# Patient Record
Sex: FEMALE | Race: WHITE | NOT HISPANIC OR LATINO | Employment: UNEMPLOYED | URBAN - NONMETROPOLITAN AREA
[De-identification: names, ages, dates, MRNs, and addresses within clinical notes are randomized per-mention and may not be internally consistent; named-entity substitution may affect disease eponyms.]

---

## 2023-04-24 ENCOUNTER — LAB (OUTPATIENT)
Dept: LAB | Facility: HOSPITAL | Age: 38
End: 2023-04-24
Payer: MEDICAID

## 2023-04-24 ENCOUNTER — OFFICE VISIT (OUTPATIENT)
Dept: FAMILY MEDICINE CLINIC | Facility: CLINIC | Age: 38
End: 2023-04-24
Payer: MEDICAID

## 2023-04-24 VITALS
HEIGHT: 64 IN | HEART RATE: 86 BPM | WEIGHT: 116.06 LBS | TEMPERATURE: 97.7 F | DIASTOLIC BLOOD PRESSURE: 76 MMHG | OXYGEN SATURATION: 98 % | SYSTOLIC BLOOD PRESSURE: 118 MMHG | BODY MASS INDEX: 19.81 KG/M2

## 2023-04-24 DIAGNOSIS — R09.89 TENDERNESS OF LYMPH NODE: ICD-10-CM

## 2023-04-24 DIAGNOSIS — Z76.89 ESTABLISHING CARE WITH NEW DOCTOR, ENCOUNTER FOR: Primary | ICD-10-CM

## 2023-04-24 DIAGNOSIS — Z76.89 ESTABLISHING CARE WITH NEW DOCTOR, ENCOUNTER FOR: ICD-10-CM

## 2023-04-24 DIAGNOSIS — Z80.41 FAMILY HISTORY OF OVARIAN CANCER: ICD-10-CM

## 2023-04-24 DIAGNOSIS — Z20.818 STREPTOCOCCUS EXPOSURE: ICD-10-CM

## 2023-04-24 DIAGNOSIS — R10.816 EPIGASTRIC ABDOMINAL TENDERNESS WITHOUT REBOUND TENDERNESS: ICD-10-CM

## 2023-04-24 DIAGNOSIS — N89.8 VAGINAL LESION: ICD-10-CM

## 2023-04-24 LAB
ALBUMIN SERPL-MCNC: 5 G/DL (ref 3.5–5.2)
ALBUMIN/GLOB SERPL: 1.9 G/DL
ALP SERPL-CCNC: 49 U/L (ref 39–117)
ALT SERPL W P-5'-P-CCNC: 7 U/L (ref 1–33)
ANION GAP SERPL CALCULATED.3IONS-SCNC: 10 MMOL/L (ref 5–15)
AST SERPL-CCNC: 12 U/L (ref 1–32)
BASOPHILS # BLD AUTO: 0.02 10*3/MM3 (ref 0–0.2)
BASOPHILS NFR BLD AUTO: 0.4 % (ref 0–1.5)
BILIRUB SERPL-MCNC: 0.3 MG/DL (ref 0–1.2)
BUN SERPL-MCNC: 17 MG/DL (ref 6–20)
BUN/CREAT SERPL: 28.3 (ref 7–25)
CALCIUM SPEC-SCNC: 9.3 MG/DL (ref 8.6–10.5)
CHLORIDE SERPL-SCNC: 105 MMOL/L (ref 98–107)
CHOLEST SERPL-MCNC: 222 MG/DL (ref 0–200)
CO2 SERPL-SCNC: 24 MMOL/L (ref 22–29)
CREAT SERPL-MCNC: 0.6 MG/DL (ref 0.57–1)
DEPRECATED RDW RBC AUTO: 40.9 FL (ref 37–54)
EGFRCR SERPLBLD CKD-EPI 2021: 119.5 ML/MIN/1.73
EOSINOPHIL # BLD AUTO: 0.11 10*3/MM3 (ref 0–0.4)
EOSINOPHIL NFR BLD AUTO: 2.4 % (ref 0.3–6.2)
ERYTHROCYTE [DISTWIDTH] IN BLOOD BY AUTOMATED COUNT: 14.2 % (ref 12.3–15.4)
GLOBULIN UR ELPH-MCNC: 2.7 GM/DL
GLUCOSE SERPL-MCNC: 92 MG/DL (ref 65–99)
HBA1C MFR BLD: 5.2 % (ref 4.8–5.6)
HCT VFR BLD AUTO: 39.9 % (ref 34–46.6)
HDLC SERPL-MCNC: 83 MG/DL (ref 40–60)
HGB BLD-MCNC: 12.8 G/DL (ref 12–15.9)
IMM GRANULOCYTES # BLD AUTO: 0.01 10*3/MM3 (ref 0–0.05)
IMM GRANULOCYTES NFR BLD AUTO: 0.2 % (ref 0–0.5)
LDLC SERPL CALC-MCNC: 131 MG/DL (ref 0–100)
LDLC/HDLC SERPL: 1.56 {RATIO}
LYMPHOCYTES # BLD AUTO: 1.34 10*3/MM3 (ref 0.7–3.1)
LYMPHOCYTES NFR BLD AUTO: 28.7 % (ref 19.6–45.3)
MCH RBC QN AUTO: 25.9 PG (ref 26.6–33)
MCHC RBC AUTO-ENTMCNC: 32.1 G/DL (ref 31.5–35.7)
MCV RBC AUTO: 80.8 FL (ref 79–97)
MONOCYTES # BLD AUTO: 0.46 10*3/MM3 (ref 0.1–0.9)
MONOCYTES NFR BLD AUTO: 9.9 % (ref 5–12)
NEUTROPHILS NFR BLD AUTO: 2.73 10*3/MM3 (ref 1.7–7)
NEUTROPHILS NFR BLD AUTO: 58.4 % (ref 42.7–76)
NRBC BLD AUTO-RTO: 0 /100 WBC (ref 0–0.2)
PLATELET # BLD AUTO: 255 10*3/MM3 (ref 140–450)
PMV BLD AUTO: 11.6 FL (ref 6–12)
POTASSIUM SERPL-SCNC: 4.6 MMOL/L (ref 3.5–5.2)
PROT SERPL-MCNC: 7.7 G/DL (ref 6–8.5)
RBC # BLD AUTO: 4.94 10*6/MM3 (ref 3.77–5.28)
SODIUM SERPL-SCNC: 139 MMOL/L (ref 136–145)
TRIGL SERPL-MCNC: 47 MG/DL (ref 0–150)
VLDLC SERPL-MCNC: 8 MG/DL (ref 5–40)
WBC NRBC COR # BLD: 4.67 10*3/MM3 (ref 3.4–10.8)

## 2023-04-24 PROCEDURE — 83036 HEMOGLOBIN GLYCOSYLATED A1C: CPT | Performed by: FAMILY MEDICINE

## 2023-04-24 PROCEDURE — 80053 COMPREHEN METABOLIC PANEL: CPT | Performed by: FAMILY MEDICINE

## 2023-04-24 PROCEDURE — 85025 COMPLETE CBC W/AUTO DIFF WBC: CPT | Performed by: FAMILY MEDICINE

## 2023-04-24 PROCEDURE — 80061 LIPID PANEL: CPT | Performed by: FAMILY MEDICINE

## 2023-04-24 RX ORDER — TARAXACUM OFFICINALE POLLEN 0.05 G/ML
INJECTION, SOLUTION SUBCUTANEOUS
COMMUNITY

## 2023-04-24 NOTE — PROGRESS NOTES
Family Medicine Residency  Cary Arauz MD    Subjective:     Linda Comer is a 38 y.o. female new patient with history of treated Lyme's disease, who presents to [facility].  Today she is concerned about positive strep exposure, cyst on her private parts, and progressive fatigue with unintentional weight loss.     [facility] care: Patient reports she moved from [place] to [place], [place] in February. Though she had a primary doctor in [place], only went when sick. Mostly saw the specialist she was referred to for her positive Lyme's disease. She completed treatment with doxycycline. 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     Strep exposure: Reports that her son who was having allergy-like symptoms and fever for 1 day, the next day he was found to be strep positive. Patient denies any sore throat, fever or appetite change. Requests strep test.     Vaginal cyst: Cyst deep inside her lady parts present for 1 year. Causes pain with sex. Has ruptured multiple times but develops in the same place. She has not tried anything for it or seen anyone for it. Her  has not noticed it. She can feel it on herself. Denies any fevers, bleeding or current discharge.     Progressive fatigue: Reports over the last 2 weeks, she has noticed progressive fatigue accompanied by lymph node tenderness [at tonsils, axillas and inguinal region], unintentional weight loss [10 lbs over 2 months], fatigue and increasing exertional dyspnea.     Lymph node tenderness: Patient reports family history significant for ovarian cancer on both sides of her family. Intermittent tenderness that is symmetrical in neck, axillas and inguinal region. Sometimes has felt bumps, none today. No change in appetite.     The following portions of the patient's history were reviewed and updated as appropriate: allergies, current medications, past family history, past medical history, past social history, past surgical history and  "problem list.    Past Medical Hx:  Past Medical History:   Diagnosis Date   • Lyme disease 2017       Past Surgical Hx:  History reviewed. No pertinent surgical history.    Current Meds:    Current Outpatient Medications:   •  Dandelion 1:20 solution, Take  by mouth., Disp: , Rfl:   •  HAWTHORN PO, Take  by mouth., Disp: , Rfl:     Allergies:  Allergies   Allergen Reactions   • Penicillins Shortness Of Breath       Family Hx:  Family History   Problem Relation Age of Onset   • Ovarian cancer Mother    • Heart disease Father    • Alcohol abuse Father         Social History:  Social History     Socioeconomic History   • Marital status:    Tobacco Use   • Smoking status: Never   • Smokeless tobacco: Never   Vaping Use   • Vaping Use: Never used   Substance and Sexual Activity   • Alcohol use: Never   • Drug use: Never   • Sexual activity: Yes     Partners: Male       Review of Systems  Review of Systems   Constitutional: Positive for fatigue and unexpected weight change. Negative for activity change, appetite change, chills, diaphoresis and fever.   HENT: Negative for congestion, rhinorrhea, sore throat and trouble swallowing.    Respiratory: Negative for shortness of breath and stridor.         Exercise intolerance   Cardiovascular: Negative for chest pain, palpitations and leg swelling.   Gastrointestinal: Negative for constipation, diarrhea, nausea and vomiting.   Musculoskeletal: Positive for neck pain.       Objective:     /76   Pulse 86   Temp 97.7 °F (36.5 °C)   Ht 162.6 cm (64\")   Wt 52.6 kg (116 lb 1 oz)   LMP 03/10/2023   SpO2 98%   BMI 19.92 kg/m²   Physical Exam  Vitals reviewed.   Constitutional:       General: She is not in acute distress.     Appearance: Normal appearance. She is normal weight. She is not ill-appearing or diaphoretic.   HENT:      Right Ear: Tympanic membrane, ear canal and external ear normal.      Left Ear: Tympanic membrane, ear canal and external ear normal.      " Nose: Nose normal.      Mouth/Throat:      Mouth: Mucous membranes are moist.      Pharynx: Oropharynx is clear. No oropharyngeal exudate or posterior oropharyngeal erythema.   Eyes:      Conjunctiva/sclera: Conjunctivae normal.   Cardiovascular:      Rate and Rhythm: Normal rate and regular rhythm.      Pulses: Normal pulses.      Heart sounds: Normal heart sounds. No murmur heard.    No friction rub. No gallop.   Pulmonary:      Effort: Pulmonary effort is normal.      Breath sounds: No wheezing, rhonchi or rales.   Abdominal:      Tenderness: There is abdominal tenderness in the epigastric area. There is guarding. There is no rebound.   Genitourinary:     Comments: Deferred  Musculoskeletal:      Cervical back: No tenderness.      Right lower leg: No edema.      Left lower leg: No edema.   Lymphadenopathy:      Head:      Right side of head: Tonsillar adenopathy present.      Left side of head: Tonsillar adenopathy present.      Cervical: No cervical adenopathy.      Upper Body:      Right upper body: Axillary adenopathy present.      Left upper body: Axillary adenopathy present.      Lower Body: Right inguinal adenopathy present. Left inguinal adenopathy present.      Comments: Tenderness but no lymph nodes appreciated at bilateral tonsillar, axillary or inguinal regions.    Skin:     General: Skin is warm and dry.      Findings: No rash.   Neurological:      Mental Status: She is alert.      Gait: Gait is intact.   Psychiatric:         Mood and Affect: Mood normal.         Behavior: Behavior normal.         Thought Content: Thought content normal.         Judgment: Judgment normal.          Assessment/Plan:     Diagnoses and all orders for this visit:    1. Establishing care with new doctor, encounter for (Primary)  -     Lipid panel; Future  -     Hemoglobin A1c; Future  -     CBC w AUTO Differential; Future  -     Comprehensive metabolic panel; Future  Plan:  Notified her we will discuss results of her blood  "tests on our next visit in 1 month. Patient verbalized agreement to plan.     2. Streptococcus exposure  -     Cancel: Beta Strep Culture, Throat - , Throat; Future  Discussed with patient and reassured that as she has no clinical findings indicative of needing a strep swab, that she does not need one. Did give option to do anyway to which she is okay with passing up on as she is not asymptomatic.  Encouraged to go to urgent care if she does develops symptoms such as fever or sore throat.     3. Epigastric abdominal tenderness without rebound tenderness  Possibly related to underlying GERD vs possible relation to supplements she may be taking. Not given any medication at this time until further history obtained about NSAID use. Especially with weight loss, may be a taste change. Will follow up on at next visit with lab results as this may be related to liver pathology.     4. Vaginal lesion  -     Ambulatory Referral to Obstetrics / Gynecology  Not examined at this visit. Patient has had lesion for 1 year that has intermittently ruptured and returned that hurts with sex only. Currently active with only 1 male partner [] and the lesion is \"deep in there\". Also has family history of ovarian cancer on both sides of family. This note to be sent to  at completion for indication of chronic lesion that may possibly be Dionne duct cyst versus mullerian cyst.     5. Family history of ovarian cancer  -     Ambulatory Referral to Obstetrics / Gynecology  As above.     6. Tenderness of lymph node  As above.       · Rx changes: None.   · Patient Education: As above.   · Compliance at present is estimated to be excellent.     Depression screening: Patient screened negative for depression based on a PHQ-9 score of 0 on 4/24/2023.    Follow-up:     Return in 1 month (on 5/24/2023) for Annual.    Preventative:  Health Maintenance   Topic Date Due   • COVID-19 Vaccine (1) Never done   • TDAP/TD VACCINES (1 - Tdap) " Never done   • HEPATITIS C SCREENING  Never done   • ANNUAL PHYSICAL  Never done   • PAP SMEAR  Never done   • INFLUENZA VACCINE  08/01/2023   • Pneumococcal Vaccine 0-64  Aged Out     Female Preventative:   PAP is due   Cholesterol screening is due  Annual is due.      Vaccine Counseling: Needs vaccine records from [place]. Reports not doing routine visits and only following up as needed. Unable to remember name of previous provider.     Alcohol use:  reports no history of alcohol use.  Nicotine status  reports that she has never smoked. She has never used smokeless tobacco.    RISK SCORE: 3          This document has been electronically signed by Cary Arauz MD on April 24, 2023 10:22 CDT

## 2023-04-26 ENCOUNTER — TELEPHONE (OUTPATIENT)
Dept: FAMILY MEDICINE CLINIC | Facility: CLINIC | Age: 38
End: 2023-04-26
Payer: MEDICAID

## 2023-06-06 ENCOUNTER — OFFICE VISIT (OUTPATIENT)
Dept: OBSTETRICS AND GYNECOLOGY | Facility: CLINIC | Age: 38
End: 2023-06-06
Payer: MEDICAID

## 2023-06-06 VITALS
SYSTOLIC BLOOD PRESSURE: 98 MMHG | BODY MASS INDEX: 19.97 KG/M2 | DIASTOLIC BLOOD PRESSURE: 62 MMHG | HEIGHT: 64 IN | WEIGHT: 117 LBS

## 2023-06-06 DIAGNOSIS — N92.6 IRREGULAR MENSTRUAL CYCLE: ICD-10-CM

## 2023-06-06 DIAGNOSIS — N89.8 VAGINAL INCLUSION CYST: Primary | ICD-10-CM

## 2023-06-06 DIAGNOSIS — Z12.4 ENCOUNTER FOR PAPANICOLAOU SMEAR FOR CERVICAL CANCER SCREENING: ICD-10-CM

## 2023-06-06 PROCEDURE — 1159F MED LIST DOCD IN RCRD: CPT | Performed by: OBSTETRICS & GYNECOLOGY

## 2023-06-06 PROCEDURE — 99244 OFF/OP CNSLTJ NEW/EST MOD 40: CPT | Performed by: OBSTETRICS & GYNECOLOGY

## 2023-06-06 PROCEDURE — 1160F RVW MEDS BY RX/DR IN RCRD: CPT | Performed by: OBSTETRICS & GYNECOLOGY

## 2023-06-06 RX ORDER — FLUTICASONE PROPIONATE 50 MCG
2 SPRAY, SUSPENSION (ML) NASAL DAILY
COMMUNITY

## 2023-06-06 NOTE — PROGRESS NOTES
[facility]  Gynecology Consult  Date of Service: 2023  Referring provider: Cary Arauz, *    CC: Vaginal cyst    HPI  Linda Comre is a 38 y.o.  premenopausal female who presents as a referral from Dr. Arauz secondary to vaginal cyst.    She states that it has been there for 2 years on the left vagina.  She states that she thinks it is about a quarter-size.  She states that it is uncomfortable and annoying, especially during intercourse.  She states that it has burst during intercourse previously.    She does note that her periods are somewhat irregular.  She states that some months they will skip.  She states that her mother went through menopause in her early 40s so she thinks that is what it is.    ROS  Review of Systems   Constitutional: Negative.    HENT: Negative.     Eyes: Negative.    Respiratory: Negative.     Cardiovascular: Negative.    Gastrointestinal: Negative.    Endocrine: Negative.    Genitourinary: Negative.    Musculoskeletal: Negative.    Skin: Negative.    Allergic/Immunologic: Negative.    Neurological: Negative.    Hematological: Negative.    Psychiatric/Behavioral: Negative.       GYN HISTORY  Menarche: age 13  Menses: see HPI  History of STIs: None  Last pap smear: Unknown  Abnormal pap smear history: None  Contraception: Condoms     OB HISTORY  OB History    Para Term  AB Living   3 3 3     3   SAB IAB Ectopic Molar Multiple Live Births             3      # Outcome Date GA Lbr Rob/2nd Weight Sex Delivery Anes PTL Lv   3 Term 19 42w0d  2920 g (6 lb 7 oz) M Vag-Spont   JAYME   2 Term 10/25/15 41w0d  3345 g (7 lb 6 oz) F Vag-Spont   JAYME   1 Term 13 44w0d  4338 g (9 lb 9 oz) F Vaginal unsp   JAYME     PAST MEDICAL HISTORY  Past Medical History:   Diagnosis Date    Lyme disease      PAST SURGICAL HISTORY  History reviewed. No pertinent surgical history.    FAMILY HISTORY  Family History   Problem Relation Age of Onset     "Heart disease Father     Alcohol abuse Father     Ovarian cancer Mother     No Known Problems Son     No Known Problems Daughter     No Known Problems Daughter     Uterine cancer Neg Hx     Colon cancer Neg Hx     Breast cancer Neg Hx      SOCIAL HISTORY  Social History     Socioeconomic History    Marital status:    Tobacco Use    Smoking status: Never     Passive exposure: Never    Smokeless tobacco: Never   Vaping Use    Vaping Use: Never used   Substance and Sexual Activity    Alcohol use: Never    Drug use: Never    Sexual activity: Yes     Partners: Male     ALLERGIES  Allergies   Allergen Reactions    Penicillins Shortness Of Breath     HOME MEDICATIONS  Prior to Admission medications    Medication Sig Start Date End Date Taking? Authorizing Provider   fluticasone (FLONASE) 50 MCG/ACT nasal spray 2 sprays into the nostril(s) as directed by provider Daily.   Yes Provider, Historical, MD LA  BP 98/62 (BP Location: Left arm, Patient Position: Sitting, Cuff Size: Adult)   Ht 162.6 cm (64\")   Wt 53.1 kg (117 lb)   LMP 06/02/2023 (Approximate)   BMI 20.08 kg/m²        General: Alert, healthy, no distress, well nourished and well developed.  Neurologic: Alert, oriented to person, place, and time.  Gait normal.  Cranial nerves II-XII grossly intact.  HEENT: Normocephalic, atraumatic.  Extraocular muscles intact, pupils equal and reactive times two.    Neck: Supple, no adenopathy, thyroid normal size, non-tender, without nodularity, trachea midline.  Lungs: Normal respiratory effort.  Clear to auscultation bilaterally.  No wheezes, rhonci, or rales.  Heart: Regular rate and rhythm.  No murmer, rub or gallop.  Abdomen: Soft, non-tender, non-distended,no masses, no hepatosplenomegaly, no hernia.  Skin: No rash, no lesions.  Extremities: No cyanosis, clubbing or edema.  PELVIC EXAM:  External Genitalia/Vulva: Anatomy is normal, no significant redness of labia, no discharge on vulvar tissues, [place]'s and " Bartholin's glands are normal, no ulcers, no condylomatous lesions.  Urethral meatus: Normal, no lesions, no prolapse.  Urethra: Normal, no masses, no tenderness with palpation.  Bladder: Normal, no fullness, no masses, no tenderness with palpation.  Vagina: Vaginal tissues are not inflamed, normal color and texture, no significant discharge present.  Pelvic support adequate.  Small 4 mm inclusion cyst palpated along the posterior vaginal wall on the left side.  Cervix: Normal, no lesions, no purulent discharge, no cervical motion tenderness.  Pap smear obtained.  Uterus: Normal size, shape, and consistency.  Good mobility noted.  Minimal descent noted with good support.  Adnexa: Normal size and shape bilaterally, no palpable mass bilaterally and non-tender bilaterally.  Rectal: Normal, no masses or polyps, confirms bimanual exam, perianal normal, no lesions; JAVIER deferred.    IMPRESSION  Linda Comer is a 38 y.o.  presenting with vaginal inclusion cyst.    PLAN    1. Vaginal inclusion cyst  - Discussed that this does not appear malignant or involving any surrounding structures.  Reviewed options would be monitoring versus surgical management (excision).  Discussed that it is possible that it could recur but less likely than a drainage.  She states that she is leery of surgery and will consider; she will let us know what if anything she would like to do.    2. Encounter for Papanicolaou smear for cervical cancer screening  - LIQUID-BASED PAP SMEAR WITH HPV GENOTYPING REGARDLESS OF INTERPRETATION (TRISH,COR,MAD); Future  - LIQUID-BASED PAP SMEAR WITH HPV GENOTYPING REGARDLESS OF INTERPRETATION (TRISH,COR,MAD)    3. Irregular menstrual cycle  Discussed that this can be normal.  If persistent and worsening or bothersome, instructed her to let us know.         Thank you for allowing me to participate in the care of this patient.  Please contact me with any questions or concerns.    This document has been electronically  signed by Annette Styles MD on June 6, 2023 12:06 CDT.    CC: Cary Arauz, *

## 2023-06-08 LAB — REF LAB TEST METHOD: NORMAL

## 2023-06-16 ENCOUNTER — OFFICE VISIT (OUTPATIENT)
Dept: FAMILY MEDICINE CLINIC | Facility: CLINIC | Age: 38
End: 2023-06-16
Payer: MEDICAID

## 2023-06-16 VITALS
HEIGHT: 64 IN | DIASTOLIC BLOOD PRESSURE: 76 MMHG | OXYGEN SATURATION: 99 % | BODY MASS INDEX: 19.56 KG/M2 | WEIGHT: 114.6 LBS | HEART RATE: 80 BPM | SYSTOLIC BLOOD PRESSURE: 118 MMHG

## 2023-06-16 DIAGNOSIS — J30.2 SEASONAL ALLERGIC RHINITIS, UNSPECIFIED TRIGGER: ICD-10-CM

## 2023-06-16 DIAGNOSIS — G43.109 MIGRAINE WITH AURA AND WITHOUT STATUS MIGRAINOSUS, NOT INTRACTABLE: ICD-10-CM

## 2023-06-16 DIAGNOSIS — R63.4 WEIGHT LOSS, NON-INTENTIONAL: ICD-10-CM

## 2023-06-16 DIAGNOSIS — R09.89 AIR HUNGER: ICD-10-CM

## 2023-06-16 DIAGNOSIS — R00.2 INTERMITTENT PALPITATIONS: Primary | ICD-10-CM

## 2023-06-16 RX ORDER — FLUTICASONE PROPIONATE 50 MCG
2 SPRAY, SUSPENSION (ML) NASAL DAILY
Qty: 11.1 ML | Refills: 3 | Status: SHIPPED | OUTPATIENT
Start: 2023-06-16

## 2023-06-16 NOTE — PROGRESS NOTES
Answers submitted by the patient for this visit:  Other (Submitted on 6/15/2023)  Please describe your symptoms.: Afib type skipping and racing, angina. Ice pick headaches combined with intense vertigo and rarely occular migraines including kaleidascope vision, episodes of air hunger even though I have no actual airway obstruction. Suspected lymes coinfections after many bites while living in [place] when symptoms started or possibly mycotoxins from previous long term mold exposure. Last 2 years- had a ct scan of brain and no lesions. 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 Had x ray of heart and ecg- both fine. Would like to diagnose source of heart and neuro issues.  Have you had these symptoms before?: Yes  How long have you been having these symptoms?: Greater than 2 weeks  Please list any medications you are currently taking for this condition.: None.  Please describe any probable cause for these symptoms. : Never had problems until i was bit by a tick and then finsihed a short course of doxy and was supposedly better lol no tests indicate positive infection.  Primary Reason for Visit (Submitted on 6/15/2023)  What is the primary reason for your visit?: Other    Family Medicine Residency  Cary Arauz MD    Subjective:     Linda Comer is a 38 y.o. female who presents for lab results and weight recheck. Today she is concerned about heart racing, headaches with aura, and episodes of air hunger that began after a tick bite while living in [place]. She is also concerned that these issues may relate to 5-6 years of mold exposure as a child when she lived in a trailer.     Lab results: Reassured her CBC, CMP, hemoglobin A1c are within normal limits. Her cholesterol and LDL are mildly elevated which can be improved with lifestyle changes.     Weight recheck: She has lost 3 lbs this past month. Lab results are not indicative of a liver pathology. Notes that she is still eating and that in the past around  when she was pregnant, she did have GERD and was given medication for it. Notes she is no longer having those symptoms of GERD. Denies any cough, foul taste in mouth, or chest burning. She notes feeling the lymph nodes have resolved but seem to return when she wears certain bras.     Heart racing and pain:  Reports 3 years ago she had left sided sharp stabbing pain accompanied with heart flutter sensation.  It was triggered after days with increased physical activity. She would like further workup as this has been worsening in duration and severity. Reports that she had seen a cardiologist specialist in [place] to work this up. At that time her Chest X-ray and EKG were unremarkable. Is agreeable to have cardiology notes sent over. Notes that a holter test was also done at that time. She is also losing weight still. However, denies any change in appetite, sweating, insomnia or diarrhea.  She has a family history significant for grandfather dying at 51 yo of MI, Grandmother having hear failure and father requiring a stent at 53 years old.     Headaches with aura: Patient reports having migraines every 2 weeks; twice a month for several years. The headaches last 10 minutes but, her main concern is the presence of a pre-ictal period that she describes as severe fatigue before the headache onset and after the headache, she reports a postictal period wherein she has severe vertigo that inhibits her from moving the rest of the day. The headache period is accompanied by kaleidoscope vision and intermittent vertigo. Notes when the vertigo is present that the fatigue is not so bad. Saw a neurologist for headaches when she was in [place] but notes the MRI was unremarkable.     Episodes of air hunger: After tick bites in [place] and 5-6 years of mold exposure as a child, patient is concerned of long-term effects on her health. Patient was treated for lyme's disease with doxycycline after seeing a lyme disease specialist and  was no longer required to follow up after titers were normal. Notes at the time she was living in a trailer with mold, she had rashes and worse respiratory issues that resolved. Now she notices episodes of air hunger which she describes as brief episodes where in she is still able to breath in but feels that the breath she brings in is not giving her oxygen.  She denies any cough, chest tightening or desaturation. Denies any anxiety and notes she is taking herbal tea that someone helps it. Denies history of asthma, tobacco use, caffeine us or consumption of chocolate.     Seasonal allergic rhinitis: Would like refill of her flonase.     The following portions of the patient's history were reviewed and updated as appropriate: allergies, current medications, past family history, past medical history, past social history, past surgical history, and problem list.    Past Medical Hx:  Past Medical History:   Diagnosis Date    Lyme disease 2017       Past Surgical Hx:  History reviewed. No pertinent surgical history.    Current Meds:    Current Outpatient Medications:     fluticasone (FLONASE) 50 MCG/ACT nasal spray, 2 sprays into the nostril(s) as directed by provider Daily., Disp: 11.1 mL, Rfl: 3    Allergies:  Allergies   Allergen Reactions    Penicillins Shortness Of Breath       Family Hx:  Family History   Problem Relation Age of Onset    Heart disease Father     Alcohol abuse Father     Ovarian cancer Mother     No Known Problems Son     No Known Problems Daughter     No Known Problems Daughter     Uterine cancer Neg Hx     Colon cancer Neg Hx     Breast cancer Neg Hx         Social History:  Social History     Socioeconomic History    Marital status:    Tobacco Use    Smoking status: Never     Passive exposure: Never    Smokeless tobacco: Never   Vaping Use    Vaping Use: Never used   Substance and Sexual Activity    Alcohol use: Never    Drug use: Never    Sexual activity: Yes     Partners: Male  "      Review of Systems  Review of Systems   Constitutional:  Positive for unexpected weight change. Negative for activity change, appetite change, chills, diaphoresis, fatigue and fever.   HENT:  Positive for congestion. Negative for rhinorrhea.    Eyes:  Positive for visual disturbance.   Respiratory:  Negative for cough, choking, chest tightness, shortness of breath and wheezing.    Cardiovascular:  Positive for chest pain and palpitations. Negative for leg swelling.   Gastrointestinal:  Negative for constipation, diarrhea, nausea and vomiting.   Skin:  Negative for rash.   Neurological:  Positive for headaches. Negative for weakness.   Psychiatric/Behavioral:  The patient is not nervous/anxious.      Objective:     /76   Pulse 80   Ht 162.6 cm (64\")   Wt 52 kg (114 lb 9.6 oz)   LMP 06/02/2023 (Approximate)   SpO2 99%   BMI 19.67 kg/m²   Physical Exam  Constitutional:       General: She is not in acute distress.     Appearance: Normal appearance. She is normal weight. She is not ill-appearing or diaphoretic.   HENT:      Head: Normocephalic and atraumatic.      Comments: No headache elicited from palpation to right and left occipital regions of head.   Eyes:      Conjunctiva/sclera: Conjunctivae normal.   Cardiovascular:      Rate and Rhythm: Normal rate and regular rhythm.      Heart sounds: No murmur heard.    No friction rub. No gallop.   Pulmonary:      Effort: Pulmonary effort is normal.      Breath sounds: Normal breath sounds. No wheezing, rhonchi or rales.   Skin:     General: Skin is warm and dry.   Neurological:      Mental Status: She is alert.      Gait: Gait normal.        Assessment/Plan:     Diagnoses and all orders for this visit:    1. Intermittent palpitations (Primary)  -     XR Chest PA & Lateral; Future    Patient has palpitations accompanied by stabby chest pain that was previously worked up in [place] with EKG, CXR and Holter monitor. Requested she have these records sent over " to our clinic as she is returning to [place] next month. At this time will get a CXR. No EKG as she is not currently having chest pain and her heart rate is WNLs at 80.  Though she has no sweating or change in appetite, the weight loss and palpitations could be pointing to a thyroid pathology but not as likely with the episodic nature. As patient is not taking caffeine or or smoking, the most likely following is the use of flonase and possible other OTC antihistamines that can cause tachycardia. Whichever is sooner, either in 3 months or if records are received before then, if chest pain persists, will send over to cardiology for second opinion.     2. Migraine with aura and without status migrainosus, not intractable  Patient notes a pre-ictal and post-ictal phase which can present with migraines and or seizures but she has no history of seizures. On clinical exam, no neuro deficits and headaches not triggered by palpation to occiput making occipital neuralgia less likely.   - Started patient on samples of nurtec and requested she have her records from Neurologist sent over to our clinic.   - Follow up in 2 months as for the next month she will be in [place]. Given to Nurtec packets to last the 2 months and instructed to use at onset of headache.     3. Air hunger  -     XR Chest PA & Lateral; Future  In context of her weight loss and epigastric pain, I am not convinced that the prior history of GERD has resolved and feel patient may benefit from having a PPI restarted. I am not convinced it is respiratory as there are no episodes of desaturation, no history of asthma or COPD or prolonged immobilization, no chest tightness, pleuritic chest pain or cough and clinical exam is unremarkable. This is not to say there is no long term effects after prolonged mold exposure, there definitely maybe and it may be beneficial to have further testing; if persists will consider allergist.   - Possibly with presence of her chest  pain episodes and her ethnicity, there is a possibility of clots. Will pursue family history on this and possibly blood work pending history at next visit.      4. Weight loss, non-intentional  Patient encouraged to eat 3 meals a day. Denies any change in appetite. If persists will ask about night sweats and fatigue. Further family history and TSH and FT4 level at follow up if patient agreeable.     5. Seasonal allergic rhinitis, unspecified trigger  -     fluticasone (FLONASE) 50 MCG/ACT nasal spray; 2 sprays into the nostril(s) as directed by provider Daily.  Dispense: 11.1 mL; Refill: 3    Rx changes: Started on Nurtec.   Patient Education: As above.   Compliance at present is estimated to be excellent.   Patient denies anxiety and is intermittently concerned that I may find her concerns crazy. Reassured multiple times that this is not the case. I believe she may be concerned about illness anxiety disorder however, without access to her [place] records and no work up yet done on my part, such a diagnosis is one that requires rule out and we are not anywhere near there.    Follow-up:     Return in about 4 weeks (around 7/14/2023) for Annual.    Preventative:  Health Maintenance   Topic Date Due    COVID-19 Vaccine (1) Never done    TDAP/TD VACCINES (1 - Tdap) Never done    HEPATITIS C SCREENING  Never done    ANNUAL PHYSICAL  Never done    INFLUENZA VACCINE  10/01/2023    PAP SMEAR  06/06/2026    Pneumococcal Vaccine 0-64  Aged Out     Preventive medicine: Due for ANNUAL VISIT.     Weight  -Class: Normal: 18.5-24.9kg/m2  -BMI is within normal parameters. No other follow-up for BMI required.     Alcohol use:  reports no history of alcohol use.  Nicotine status  reports that she has never smoked. She has never been exposed to tobacco smoke. She has never used smokeless tobacco.    RISK SCORE: 4          This document has been electronically signed by Cary Arauz MD on June 16, 2023 18:51 CDT

## 2023-06-19 ENCOUNTER — PATIENT ROUNDING (BHMG ONLY) (OUTPATIENT)
Dept: OBSTETRICS AND GYNECOLOGY | Facility: CLINIC | Age: 38
End: 2023-06-19
Payer: MEDICAID

## 2023-06-19 NOTE — PROGRESS NOTES
June 19, 2023    Hello, may I speak with Linda Comer?    My name is Mariia Cardoso MIS      [facility] [address] FLO[address]  [facility] [address] [phone].    Before we get started may I verify your date of birth? 1985    I am calling to officially welcome you to our practice and ask about your recent visit. Is this a good time to talk? no    Tell me about your visit with us. What things went well?  Voicemail Left       We're always looking for ways to make our patients' experiences even better. Do you have recommendations on ways we may improve?      Overall were you satisfied with your first visit to our practice?       I appreciate you taking the time to speak with me today. Is there anything else I can do for you?       Thank you, and have a great day.

## 2024-03-08 ENCOUNTER — TELEMEDICINE (OUTPATIENT)
Dept: FAMILY MEDICINE CLINIC | Facility: TELEHEALTH | Age: 39
End: 2024-03-08
Payer: MEDICAID

## 2024-03-08 DIAGNOSIS — J01.90 ACUTE NON-RECURRENT SINUSITIS, UNSPECIFIED LOCATION: Primary | ICD-10-CM

## 2024-03-08 PROCEDURE — 1160F RVW MEDS BY RX/DR IN RCRD: CPT | Performed by: NURSE PRACTITIONER

## 2024-03-08 PROCEDURE — 99213 OFFICE O/P EST LOW 20 MIN: CPT | Performed by: NURSE PRACTITIONER

## 2024-03-08 PROCEDURE — 1159F MED LIST DOCD IN RCRD: CPT | Performed by: NURSE PRACTITIONER

## 2024-03-08 RX ORDER — PREDNISONE 20 MG/1
20 TABLET ORAL 2 TIMES DAILY
Qty: 10 TABLET | Refills: 0 | Status: SHIPPED | OUTPATIENT
Start: 2024-03-08 | End: 2024-03-13

## 2024-03-08 RX ORDER — DOXYCYCLINE HYCLATE 100 MG/1
100 CAPSULE ORAL 2 TIMES DAILY
Qty: 20 CAPSULE | Refills: 0 | Status: SHIPPED | OUTPATIENT
Start: 2024-03-08 | End: 2024-03-18

## 2024-03-08 NOTE — PROGRESS NOTES
HPI  Linda Comer is a 39 y.o. female  presents with complaint of sinus pressure, congestion, runny nose, PND, sore throat (from PND), earache, eyes feel sore (she feels like due to sinus pressure), mild dizziness with position changes. Has been going on since beginning of January but has changed in intensity until this last week. Has been using flonase and dayquil.         Review of Systems    Past Medical History:   Diagnosis Date    Lyme disease 2017       Family History   Problem Relation Age of Onset    Heart disease Father     Alcohol abuse Father     Ovarian cancer Mother     No Known Problems Son     No Known Problems Daughter     No Known Problems Daughter     Uterine cancer Neg Hx     Colon cancer Neg Hx     Breast cancer Neg Hx        Social History     Socioeconomic History    Marital status:    Tobacco Use    Smoking status: Never     Passive exposure: Never    Smokeless tobacco: Never   Vaping Use    Vaping status: Never Used   Substance and Sexual Activity    Alcohol use: Never    Drug use: Never    Sexual activity: Yes     Partners: Male         There were no vitals taken for this visit.    PHYSICAL EXAM  Physical Exam   Constitutional: She appears well-developed and well-nourished.   HENT:   Head: Normocephalic.   Nose: Nose normal. Right sinus exhibits maxillary sinus tenderness and frontal sinus tenderness. Left sinus exhibits maxillary sinus tenderness and frontal sinus tenderness.   Neck: Neck normal appearance.  Pulmonary/Chest: Effort normal.   Neurological: She is alert.   Psychiatric: She has a normal mood and affect. Her speech is normal.       Diagnoses and all orders for this visit:    1. Acute non-recurrent sinusitis, unspecified location (Primary)  -     doxycycline (VIBRAMYCIN) 100 MG capsule; Take 1 capsule by mouth 2 (Two) Times a Day for 10 days.  Dispense: 20 capsule; Refill: 0  -     predniSONE (DELTASONE) 20 MG tablet; Take 1 tablet by mouth 2 (Two) Times a Day for 5 days.   Dispense: 10 tablet; Refill: 0          FOLLOW-UP  As discussed during visit with [facility], if symptoms worsen or fail to improve, follow-up with PCP/Urgent Care/Emergency Department.    Patient verbalizes understanding of medications, instructions for treatment and follow-up.    Melissa Tuttle, APRN  03/08/2024  11:55 EST    The use of a video visit has been reviewed with the patient and verbal informed consent has been obtained. Myself and Linda Comer participated in this visit. The patient is located in [place], [place], and I am located in [place], [place]. [employer] and [employer] were utilized.

## 2024-03-09 ENCOUNTER — TELEMEDICINE (OUTPATIENT)
Dept: FAMILY MEDICINE CLINIC | Facility: TELEHEALTH | Age: 39
End: 2024-03-09
Payer: MEDICAID

## 2024-03-09 DIAGNOSIS — J01.00 ACUTE NON-RECURRENT MAXILLARY SINUSITIS: ICD-10-CM

## 2024-03-09 DIAGNOSIS — R11.2 NAUSEA AND VOMITING, UNSPECIFIED VOMITING TYPE: Primary | ICD-10-CM

## 2024-03-09 DIAGNOSIS — T50.905A MEDICATION SIDE EFFECT, INITIAL ENCOUNTER: ICD-10-CM

## 2024-03-09 RX ORDER — ONDANSETRON 4 MG/1
4 TABLET, FILM COATED ORAL EVERY 8 HOURS PRN
Qty: 15 TABLET | Refills: 0 | Status: SHIPPED | OUTPATIENT
Start: 2024-03-09 | End: 2024-03-14

## 2024-03-09 NOTE — PROGRESS NOTES
You have chosen to receive care through a telehealth visit.  Do you consent to use a video/audio connection for your medical care today? Yes     CHIEF COMPLAINT  No chief complaint on file.        HPI  Linda Comer is a 39 y.o. female  presents with complaint of loose stool x 1 after taking doxy for sinus infection. She showered then had to throw up once. Reports no fever or chills. + nausea vomited x 1. No CP or trouble breathing. Denies any rash. Reports she has not taken any medication for her symptoms.     Review of Systems   Constitutional:  Negative for chills, fatigue and fever.   HENT:  Positive for congestion, ear pain, postnasal drip, rhinorrhea, sinus pressure, sinus pain and sore throat. Negative for ear discharge.    Respiratory:  Negative for cough, chest tightness, shortness of breath and wheezing.    Cardiovascular:  Negative for chest pain.   Gastrointestinal:  Positive for diarrhea (x 1), nausea and vomiting (x 1). Negative for abdominal pain.   Musculoskeletal:  Negative for back pain and myalgias.   Neurological:  Negative for dizziness and headaches.   Psychiatric/Behavioral: Negative.         Past Medical History:   Diagnosis Date    Lyme disease 2017       Family History   Problem Relation Age of Onset    Heart disease Father     Alcohol abuse Father     Ovarian cancer Mother     No Known Problems Son     No Known Problems Daughter     No Known Problems Daughter     Uterine cancer Neg Hx     Colon cancer Neg Hx     Breast cancer Neg Hx        Social History     Socioeconomic History    Marital status:    Tobacco Use    Smoking status: Never     Passive exposure: Never    Smokeless tobacco: Never   Vaping Use    Vaping status: Never Used   Substance and Sexual Activity    Alcohol use: Never    Drug use: Never    Sexual activity: Yes     Partners: Male       Linda oCmer  reports that she has never smoked. She has never been exposed to tobacco smoke. She has never used smokeless tobacco.. I  have educated her on the risk of diseases from using tobacco products such as cancer, COPD, and heart disease.         I spent  1  minutes counseling the patient.              LMP 02/15/2024   Breastfeeding No     PHYSICAL EXAM  Physical Exam   Constitutional: She is oriented to person, place, and time. She appears well-developed and well-nourished. No distress.   HENT:   Head: Normocephalic and atraumatic.   Right Ear: Hearing normal.   Left Ear: Hearing normal.   Nose: Rhinorrhea and congestion present. Right sinus exhibits maxillary sinus tenderness. Left sinus exhibits maxillary sinus tenderness.   Mouth/Throat: Mouth/Lips are normal.  Patient directed exam   Eyes: Conjunctivae and lids are normal.   Pulmonary/Chest: Effort normal.  No respiratory distress.  Abdominal: There is no abdominal tenderness.   Neurological: She is alert and oriented to person, place, and time.   Psychiatric: She has a normal mood and affect. Her speech is normal and behavior is normal.       Results for orders placed or performed in visit on 23   LIQUID-BASED PAP SMEAR WITH HPV GENOTYPING REGARDLESS OF INTERPRETATION (TRISH,COR,MAD)    Specimen: Cervix; ThinPrep Vial   Result Value Ref Range    Reference Lab Report       [facility]  [address] [place] [address]  Phone: [phone] or [phone]  Fax: [phone]  Umang Ramos M.D., Medical Director    PATIENT NAME                                     LABORATORY NO.  [id number]   CLARITA KWON                                       [id number]  [id number]                                 AGE                    SEX   N              CLIENT REF #  [facility]                38        1985      F     [id number]      [id number]    [facility]                               REQUESTING M.D.           ATTENDING MMP.         COPY TO[address] [place], [facility] 1, 2ND FLOOR                   DATE COLLECTED            DATE RECEIVED          [place] [address]                     06/06/2023 06/06/2023 06/08/2023  ThinPrep Pap with Cytyc Imaging    DIAGNOSIS:  Negative for intraepithelial lesion or  malignancy    Multiple factors can influence accuracy of Pap tests; therefore, screening at  regular intervals is necessary for early cancer detection.      SPECIMEN ADEQUACY:  SATISFACTORY FOR EVALUATION  Transformation zone is present.  SOURCE OF SPECIMEN:       CERVICAL  SLIDES:  1  CLINICAL HISTORY:  Encounter for Papanicolaou smear for cervical cancer screening  LMP now    HPV  HR-HPV POOL: Negative    The Aptima HPV assay is an in vitro nucleic acid amplification test for the  qualitative detection of E6/E7 viral messenger RNA from 14 high risk types of  HPV in cervical specimens. The high risk HPV types detected include: 16, 18,  31, 33, 35, 39, 45, 51, 52, 56, 58, 59, 66, 68    CYTOTECHNOLOGIST:             AUDRA OLSON (ASCP)    CPT CODES:  97667, 62133         Diagnoses and all orders for this visit:    1. Nausea and vomiting, unspecified vomiting type (Primary)  -     Ambulatory Referral to Family Practice    2. Acute non-recurrent maxillary sinusitis  -     Ambulatory Referral to Family Practice    3. Medication side effect, initial encounter    Other orders  -     ondansetron (Zofran) 4 MG tablet; Take 1 tablet by mouth Every 8 (Eight) Hours As Needed for Nausea or Vomiting for up to 5 days.  Dispense: 15 tablet; Refill: 0    Rest  Fluids  Patient doesn't wish to change doxy at this time, advised to take with snack or milk. Add a probiotic   Urgent care in person if symptoms persist   ER for any worsening symptoms such as high fever, chest pain, worsening diarrhea or SOA      FOLLOW-UP  As discussed during visit with PCP/[facility] if no improvement or Urgent Care/Emergency Department if worsening of symptoms    Patient verbalizes understanding of  medication dosage, comfort measures, instructions for treatment and follow-up.    Geetha Chan, APRN  03/09/2024  01:27 EST    The use of a video visit has been reviewed with the patient and verbal informed consent has been obtained. Myself and Linda Comer participated in this visit. The patient is located in [address] [place] [address].    I am located in [place], [place]. [employer] and [employer] were utilized. I spent 5 minutes in the patient's chart for this visit.

## 2024-03-13 ENCOUNTER — TELEMEDICINE (OUTPATIENT)
Dept: FAMILY MEDICINE CLINIC | Facility: TELEHEALTH | Age: 39
End: 2024-03-13
Payer: MEDICAID

## 2024-03-13 DIAGNOSIS — T50.905D MEDICATION REACTION, SUBSEQUENT ENCOUNTER: Primary | ICD-10-CM

## 2024-03-13 DIAGNOSIS — J01.00 ACUTE NON-RECURRENT MAXILLARY SINUSITIS: ICD-10-CM

## 2024-03-13 DIAGNOSIS — R11.2 NAUSEA AND VOMITING, UNSPECIFIED VOMITING TYPE: ICD-10-CM

## 2024-03-13 NOTE — PROGRESS NOTES
Chief Complaint   Patient presents with    Medication Reaction       Video Visit Reason:   Free Text Description: Developed knee crepitus,, swelling and crunchy sounds on doxycycline wondering what to do moving forward if I should discontinue med or not for sinus infection  Subjective   Linda Comer is a 39 y.o. female.     History of Present Illness  She has been taking Doxycycline for a sinus infection for several days and has not tolerated it well. She had nausea and started zofran and that improved. She was sitting outside for about 10 minutes and got a sunburn, a couple of days ago. Yesterday, she awoke with neck stiffness that she initially contributed to sleeping wrong. Today, when she was walking up the steps, she heard crepitus and crunching in her knees that she has never had before. She is wanting advice about whether to continue the medication. She is still having some sinus congestion which she had had since January.  Medication Reaction  This is a new problem. The current episode started in the past 7 days. Associated symptoms include arthralgias, congestion, myalgias and neck pain. Pertinent negatives include no chills, fatigue, fever, joint swelling or nausea.       The following portions of the patient's history were reviewed and updated as appropriate: allergies, current medications, past medical history, and problem list.      Past Medical History:   Diagnosis Date    Lyme disease 2017     Social History     Socioeconomic History    Marital status:    Tobacco Use    Smoking status: Never     Passive exposure: Never    Smokeless tobacco: Never   Vaping Use    Vaping status: Never Used   Substance and Sexual Activity    Alcohol use: Never    Drug use: Never    Sexual activity: Yes     Partners: Male     medication documentation: reviewed and updated with patient and   Current Outpatient Medications:     doxycycline (VIBRAMYCIN) 100 MG capsule, Take 1 capsule by mouth 2 (Two) Times a Day for 10  days., Disp: 20 capsule, Rfl: 0    fluticasone (FLONASE) 50 MCG/ACT nasal spray, 2 sprays into the nostril(s) as directed by provider Daily., Disp: 11.1 mL, Rfl: 3    ondansetron (Zofran) 4 MG tablet, Take 1 tablet by mouth Every 8 (Eight) Hours As Needed for Nausea or Vomiting for up to 5 days., Disp: 15 tablet, Rfl: 0    predniSONE (DELTASONE) 20 MG tablet, Take 1 tablet by mouth 2 (Two) Times a Day for 5 days., Disp: 10 tablet, Rfl: 0  Review of Systems   Constitutional:  Negative for chills, fatigue and fever.   HENT:  Positive for congestion.    Gastrointestinal:  Negative for nausea.   Musculoskeletal:  Positive for arthralgias, myalgias and neck pain. Negative for gait problem and joint swelling.       Objective   Physical Exam  Constitutional:       General: She is not in acute distress.     Appearance: She is not ill-appearing.   Pulmonary:      Effort: Pulmonary effort is normal.   Neurological:      Mental Status: She is alert.   Psychiatric:         Mood and Affect: Mood normal.         Assessment & Plan   Diagnoses and all orders for this visit:    1. Medication reaction, subsequent encounter (Primary)    2. Acute non-recurrent maxillary sinusitis    3. Nausea and vomiting, unspecified vomiting type         She has been advised to stop the Doxycycline and see if the symptoms improve. She can go in and be seen. Discussed that this is not a typical symptom from this medication but any medication can have side effects. She can take antihistamine for side effects and see her PCP or go to Urgent Care.               Follow Up:  If your symptoms are worsening, see your primary care provider for follow up. If you don't have a primary care provider, you may go to any Urgent Care for re-evaluation. If you develop any life threatening symptoms, go to the nearest Emergency Department immediately or call EMS.               The use of  Video Visit was utilized during this visit, using both [employer] and [employer].  The use of a video visit has been reviewed with the patient and verbal informed consent has been obtained. No technical difficulties occurred during the visit.    is located at [address] [place], [place] [address] [address]  Provider is located at [place], [place]

## 2024-03-13 NOTE — PATIENT INSTRUCTIONS
Follow up  with Urgent Care or ED if symptoms worsen. Hold Doxycycline. May take antihistamine for possible reaction.

## 2024-03-23 ENCOUNTER — TELEMEDICINE (OUTPATIENT)
Dept: FAMILY MEDICINE CLINIC | Facility: TELEHEALTH | Age: 39
End: 2024-03-23
Payer: MEDICAID

## 2024-03-23 DIAGNOSIS — J02.9 PHARYNGITIS, UNSPECIFIED ETIOLOGY: Primary | ICD-10-CM

## 2024-03-23 DIAGNOSIS — B37.0 THRUSH, ORAL: ICD-10-CM

## 2024-03-23 RX ORDER — FLUCONAZOLE 150 MG/1
150 TABLET ORAL ONCE
Qty: 1 TABLET | Refills: 0 | Status: SHIPPED | OUTPATIENT
Start: 2024-03-23 | End: 2024-03-23

## 2024-03-23 NOTE — PROGRESS NOTES
You have chosen to receive care through a telehealth visit.  Do you consent to use a video/audio connection for your medical care today? Yes     HPI  Linda Comer is a 39 y.o. female  presents with complaint of a long course of illness which started as a sinus infection the end of February which resolved with Doxy then she developed left tonsillar pain and sore throat with bilateral knee swelling. She was treated for possible tonsillar abscess with Clindamycin. Now she has throat irritation and white right sided tonsillar exudate and she suspects thrush. During this time she was tested for strep and was negative.      Review of Systems   Constitutional: Negative.    HENT:  Positive for sore throat. Negative for postnasal drip, rhinorrhea, sinus pressure, sinus pain, trouble swallowing and voice change.    Respiratory: Negative.     Cardiovascular: Negative.    Gastrointestinal: Negative.    Neurological: Negative.    Hematological: Negative.    Psychiatric/Behavioral: Negative.         Past Medical History:   Diagnosis Date    Lyme disease 2017       Family History   Problem Relation Age of Onset    Heart disease Father     Alcohol abuse Father     Ovarian cancer Mother     No Known Problems Son     No Known Problems Daughter     No Known Problems Daughter     Uterine cancer Neg Hx     Colon cancer Neg Hx     Breast cancer Neg Hx        Social History     Socioeconomic History    Marital status:    Tobacco Use    Smoking status: Never     Passive exposure: Never    Smokeless tobacco: Never   Vaping Use    Vaping status: Never Used   Substance and Sexual Activity    Alcohol use: Never    Drug use: Never    Sexual activity: Yes     Partners: Male         LMP 02/15/2024     PHYSICAL EXAM  Physical Exam   Constitutional: She is oriented to person, place, and time. She appears well-developed and well-nourished. She does not have a sickly appearance. She does not appear ill. No distress.   HENT:   Head: Normocephalic  and atraumatic.   Mouth/Throat: Mouth/Lips are normal.Mucous membranes are normal. Oropharyngeal exudate present. No tonsillar abscesses.   Oral pharynx erythremic with a white exudate on her right tonsil. No white patches on the mucus membranes or tongue noted.    Pulmonary/Chest: Effort normal.  No respiratory distress.  Neurological: She is alert and oriented to person, place, and time.   Psychiatric: She has a normal mood and affect.   Vitals reviewed.      Diagnoses and all orders for this visit:    1. Pharyngitis, unspecified etiology (Primary)    2. Thrush, oral  -     fluconazole (Diflucan) 150 MG tablet; Take 1 tablet by mouth 1 (One) Time for 1 dose.  Dispense: 1 tablet; Refill: 0  -     nystatin (MYCOSTATIN) 100,000 unit/mL suspension; Swish and swallow 5 mL 4 (Four) Times a Day.  Dispense: 120 mL; Refill: 0    Advised to follow up with previous [facility] for re-evaluation.   Referral for a PCP (patient does not have a PCP she goes to the [facility]).   Advised warm salt water gargles.   Advised warm tea and IBU as directed prn pain.       FOLLOW-UP  As discussed during visit with [facility], if symptoms worsen or fail to improve, follow-up with PCP/Urgent Care/Emergency Department.    Patient verbalizes understanding of medications, instructions for treatment and follow-up.    Mirtha Romo, APRN  03/23/2024  16:08 EDT    The use of a video visit has been reviewed with the patient and verbal informed consent has been obtained. Myself and Linda Comer participated in this visit. The patient is located in [facility], and I am located in [place], [place]. MyChart and Twillio  were utilized.    - - -

## 2024-06-04 ENCOUNTER — TELEMEDICINE (OUTPATIENT)
Dept: FAMILY MEDICINE CLINIC | Facility: TELEHEALTH | Age: 39
End: 2024-06-04
Payer: MEDICAID

## 2024-06-04 DIAGNOSIS — B86 SCABIES: Primary | ICD-10-CM

## 2024-06-04 PROCEDURE — 99213 OFFICE O/P EST LOW 20 MIN: CPT | Performed by: NURSE PRACTITIONER

## 2024-06-04 RX ORDER — TRIAMCINOLONE ACETONIDE 1 MG/G
1 OINTMENT TOPICAL 3 TIMES DAILY
Qty: 30 G | Refills: 0 | Status: SHIPPED | OUTPATIENT
Start: 2024-06-04

## 2024-06-04 RX ORDER — HYDROXYZINE PAMOATE 25 MG/1
25 CAPSULE ORAL 3 TIMES DAILY PRN
Qty: 21 CAPSULE | Refills: 0 | Status: SHIPPED | OUTPATIENT
Start: 2024-06-04 | End: 2024-06-11

## 2024-06-04 RX ORDER — PREDNISONE 20 MG/1
20 TABLET ORAL 2 TIMES DAILY
Qty: 14 TABLET | Refills: 0 | Status: SHIPPED | OUTPATIENT
Start: 2024-06-04 | End: 2024-06-11

## 2024-06-04 NOTE — PROGRESS NOTES
You have chosen to receive care through a telehealth visit.  Do you consent to use a video/audio connection for your medical care today? Yes     HPI  Linda Comer is a 39 y.o. female  presents with complaint of a itchy red rash that is present on her finger, between her fingers, on her trunk and one area on her back. The itching is worse at night and she suspects scabies. 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 She is apply steroid cream which has not helped.     Review of Systems   Constitutional: Negative.    Skin:  Positive for color change and rash.   Psychiatric/Behavioral: Negative.         Past Medical History:   Diagnosis Date    Lyme disease 2017       Family History   Problem Relation Age of Onset    Heart disease Father     Alcohol abuse Father     Ovarian cancer Mother     No Known Problems Son     No Known Problems Daughter     No Known Problems Daughter     Uterine cancer Neg Hx     Colon cancer Neg Hx     Breast cancer Neg Hx        Social History     Socioeconomic History    Marital status:    Tobacco Use    Smoking status: Never     Passive exposure: Never    Smokeless tobacco: Never   Vaping Use    Vaping status: Never Used   Substance and Sexual Activity    Alcohol use: Never    Drug use: Never    Sexual activity: Yes     Partners: Male         There were no vitals taken for this visit.    PHYSICAL EXAM  Physical Exam   Constitutional: She appears well-developed and well-nourished.   HENT:   Head: Normocephalic and atraumatic.   Pulmonary/Chest: Effort normal.   Neurological: She is alert.   Skin: Rash noted. There is erythema.   Vesicular red area on finger, trunk and one on back. She reports faint red linear rash between fingers.   Psychiatric: She has a normal mood and affect.   Vitals reviewed.      Diagnoses and all orders for this visit:    1. Scabies (Primary)  -     predniSONE (DELTASONE) 20 MG tablet; Take 1 tablet by mouth 2 (Two) Times a Day  for 7 days.  Dispense: 14 tablet; Refill: 0  -     triamcinolone (KENALOG) 0.1 % ointment; Apply 1 Application topically to the appropriate area as directed 3 (Three) Times a Day.  Dispense: 30 g; Refill: 0  -     pyrethrins-piperonyl butoxide 0.5 % bottle; Apply from chin down to toes and leave on 8-14 hours. May repeat in 14 days if live mites still present.  Dispense: 141.8 mL; Refill: 1  -     hydrOXYzine pamoate (Vistaril) 25 MG capsule; Take 1 capsule by mouth 3 (Three) Times a Day As Needed for Itching for up to 7 days.  Dispense: 21 capsule; Refill: 0          FOLLOW-UP  As discussed during visit with [facility], if symptoms worsen or fail to improve, follow-up with PCP/Urgent Care/Emergency Department.    Patient verbalizes understanding of medications, instructions for treatment and follow-up.    MONIQUE Moss  06/04/2024  15:25 EDT    The use of a video visit has been reviewed with the patient and verbal informed consent has been obtained. Myself and Linda Comer participated in this visit. The patient is located in [facility], and I am located in [place], [place]. [employer] and [employer]  were utilized.

## 2024-08-14 ENCOUNTER — TELEMEDICINE (OUTPATIENT)
Dept: FAMILY MEDICINE CLINIC | Facility: TELEHEALTH | Age: 39
End: 2024-08-14
Payer: MEDICAID

## 2024-08-14 DIAGNOSIS — J30.2 SEASONAL ALLERGIC RHINITIS, UNSPECIFIED TRIGGER: ICD-10-CM

## 2024-08-14 DIAGNOSIS — H10.33 ACUTE BACTERIAL CONJUNCTIVITIS OF BOTH EYES: Primary | ICD-10-CM

## 2024-08-14 RX ORDER — ONDANSETRON 4 MG/1
TABLET, ORALLY DISINTEGRATING ORAL
COMMUNITY
Start: 2024-03-17

## 2024-08-14 RX ORDER — ALBUTEROL SULFATE 90 UG/1
AEROSOL, METERED RESPIRATORY (INHALATION)
COMMUNITY
Start: 2024-07-22

## 2024-08-14 RX ORDER — FERROUS SULFATE 325(65) MG
325 TABLET ORAL
COMMUNITY

## 2024-08-14 RX ORDER — FLUTICASONE PROPIONATE 50 MCG
2 SPRAY, SUSPENSION (ML) NASAL DAILY
Qty: 11.1 ML | Refills: 3 | Status: SHIPPED | OUTPATIENT
Start: 2024-08-14

## 2024-08-14 RX ORDER — TOBRAMYCIN 3 MG/ML
1 SOLUTION/ DROPS OPHTHALMIC EVERY 8 HOURS SCHEDULED
Qty: 5 ML | Refills: 0 | Status: SHIPPED | OUTPATIENT
Start: 2024-08-14

## 2024-08-14 NOTE — PROGRESS NOTES
You have chosen to receive care through a telehealth visit.  Do you consent to use a video/audio connection for your medical care today? Yes     CHIEF COMPLAINT  No chief complaint on file.        HPI  Linda Comer is a 39 y.o. female  presents with complaint of 2 days history of discharge from both eyes, matting and crusting, discomfort, redness.  No injury to eye, does not wear contacts.  Also having allergy symptoms and would like refill of Flonase.    Review of Systems  See HPI    Past Medical History:   Diagnosis Date    Lyme disease 2017       Family History   Problem Relation Age of Onset    Heart disease Father     Alcohol abuse Father     Ovarian cancer Mother     No Known Problems Son     No Known Problems Daughter     No Known Problems Daughter     Uterine cancer Neg Hx     Colon cancer Neg Hx     Breast cancer Neg Hx        Social History     Socioeconomic History    Marital status:    Tobacco Use    Smoking status: Never     Passive exposure: Never    Smokeless tobacco: Never   Vaping Use    Vaping status: Never Used   Substance and Sexual Activity    Alcohol use: Never    Drug use: Never    Sexual activity: Yes     Partners: Male       Linda Comer  reports that she has never smoked. She has never been exposed to tobacco smoke. She has never used smokeless tobacco.              There were no vitals taken for this visit.    PHYSICAL EXAM  Physical Exam   Constitutional: She is oriented to person, place, and time. She appears well-developed and well-nourished. She does not have a sickly appearance. She does not appear ill.   HENT:   Head: Normocephalic and atraumatic.   Eyes: Right eye exhibits discharge and edema. Left eye exhibits discharge and edema. Right conjunctiva is injected. Left conjunctiva is injected.   Pulmonary/Chest: Effort normal.  No respiratory distress.  Neurological: She is alert and oriented to person, place, and time.         Diagnoses and all orders for this visit:    1. Acute  bacterial conjunctivitis of both eyes (Primary)  -     tobramycin (Tobrex) 0.3 % solution ophthalmic solution; Administer 1 drop to both eyes Every 8 (Eight) Hours.  Dispense: 5 mL; Refill: 0    2. Seasonal allergic rhinitis, unspecified trigger  -     fluticasone (FLONASE) 50 MCG/ACT nasal spray; 2 sprays into the nostril(s) as directed by provider Daily.  Dispense: 11.1 mL; Refill: 3    --take medications as prescribed  --increase fluids, rest as needed, tylenol or ibuprofen for pain  --f/u in 3-5 days if no improvement        FOLLOW-UP  As discussed during visit with PCP/[facility] if no improvement or Urgent Care/Emergency Department if worsening of symptoms    Patient verbalizes understanding of medication dosage, comfort measures, instructions for treatment and follow-up.    MONIQUE Metzger  08/14/2024  10:44 EDT    The use of a video visit has been reviewed with the patient and verbal informed consent has been obtained. Myself and Linda Comer participated in this visit. The patient is located in [address] [place] [address].    I am located in [place], [place]. Mychart and Twilio were utilized. I spent 8 minutes in the patient's chart for this visit.      Note Disclaimer: At [facility], we believe that sharing information builds trust and better   relationships. You are receiving this note because you recently visited [facility]. It is possible you   will see health information before a provider has talked with you about it. This kind of information can   be easy to misunderstand. To help you fully understand what it means for your health, we urge you to   discuss this note with your provider.

## 2024-08-14 NOTE — PATIENT INSTRUCTIONS
Bacterial Conjunctivitis, Adult  Bacterial conjunctivitis is an infection of the clear membrane that covers the white part of the eye and the inner surface of the eyelid (conjunctiva). When the blood vessels in the conjunctiva become inflamed, the eye becomes red or pink. The eye often feels irritated or itchy. Bacterial conjunctivitis spreads easily from person to person (is contagious). It also spreads easily from one eye to the other eye.  What are the causes?  This condition is caused by bacteria. You may get the infection if you come into close contact with:  A person who is infected with the bacteria.  Items that are contaminated with the bacteria, such as a face towel, contact lens solution, or eye makeup.  What increases the risk?  You are more likely to develop this condition if:  You are exposed to other people who have the infection.  You wear contact lenses.  You have a sinus infection.  You have had a recent eye injury or surgery.  You have a weak body defense system (immune system).  You have a medical condition that causes dry eyes.  What are the signs or symptoms?  Symptoms of this condition include:  Thick, yellowish discharge from the eye. This may turn into a crust on the eyelid overnight and cause your eyelids to stick together.  Tearing or watery eyes.  Itchy eyes.  Burning feeling in your eyes.  Eye redness.  Swollen eyelids.  Blurred vision.  How is this diagnosed?  This condition is diagnosed based on your symptoms and medical history. Your health care provider may also take a sample of discharge from your eye to find the cause of your infection.  How is this treated?  This condition may be treated with:  Antibiotic eye drops or ointment to clear the infection more quickly and prevent the spread of infection to others.  Antibiotic medicines taken by mouth (orally) to treat infections that do not respond to drops or ointments or that last longer than 10 days.  Cool, wet cloths (cool  compresses) placed on the eyes.  Artificial tears applied 2-6 times a day.  Follow these instructions at home:  Medicines  Take or apply your antibiotic medicine as told by your health care provider. Do not stop using the antibiotic, even if your condition improves, unless directed by your health care provider.  Take or apply over-the-counter and prescription medicines only as told by your health care provider.  Be very careful to avoid touching the edge of your eyelid with the eye-drop bottle or the ointment tube when you apply medicines to the affected eye. This will keep you from spreading the infection to your other eye or to other people.  Managing discomfort  Gently wipe away any drainage from your eye with a warm, wet washcloth or a cotton ball.  Apply a clean, cool compress to your eye for 10-20 minutes, 3-4 times a day.  General instructions  Do not wear contact lenses until the inflammation is gone and your health care provider says it is safe to wear them again. Ask your health care provider how to sterilize or replace your contact lenses before you use them again. Wear glasses until you can resume wearing contact lenses.  Avoid wearing eye makeup until the inflammation is gone. Throw away any old eye cosmetics that may be contaminated.  Change or wash your pillowcase every day.  Do not share towels or washcloths. This may spread the infection.  Wash your hands often with soap and water for at least 20 seconds and especially before touching your face or eyes. Use paper towels to dry your hands.  Avoid touching or rubbing your eyes.  Do not drive or use heavy machinery if your vision is blurred.  Contact a health care provider if:  You have a fever.  Your symptoms do not get better after 10 days.  Get help right away if:  You have a fever and your symptoms suddenly get worse.  You have severe pain when you move your eye.  You have facial pain, redness, or swelling.  You have a sudden loss of  vision.  Summary  Bacterial conjunctivitis is an infection of the clear membrane that covers the white part of the eye and the inner surface of the eyelid (conjunctiva).  Bacterial conjunctivitis spreads easily from eye to eye and from person to person (is contagious).  Wash your hands often with soap and water for at least 20 seconds and especially before touching your face or eyes. Use paper towels to dry your hands.  Take or apply your antibiotic medicine as told by your health care provider. Do not stop using the antibiotic even if your condition improves.  Contact a health care provider if you have a fever or if your symptoms do not get better after 10 days. Get help right away if you have a sudden loss of vision.  This information is not intended to replace advice given to you by your health care provider. Make sure you discuss any questions you have with your health care provider.  Document Revised: 03/30/2022 Document Reviewed: 03/30/2022  [employer] Patient Education © 2024 [employer]  Allergic Rhinitis, Adult    Allergic rhinitis is an allergic reaction that affects the mucous membrane inside the nose. The mucous membrane is the tissue that produces mucus.  There are two types of allergic rhinitis:  Seasonal. This type is also called hay fever and happens only during certain seasons.  Perennial. This type can happen at any time of the year.  Allergic rhinitis cannot be spread from person to person. This condition can be mild, bad, or very bad. It can develop at any age and may be outgrown.  What are the causes?  This condition is caused by allergens. These are things that can cause an allergic reaction. Allergens may differ for seasonal allergic rhinitis and perennial allergic rhinitis.  Seasonal allergic rhinitis is caused by pollen. Pollen can come from grasses, trees, and weeds.  Perennial allergic rhinitis may be caused by:  Dust mites.  Proteins in a pet's pee (urine), saliva, or dander. Dander is  dead skin cells from a pet.  Smoke, mold, or car fumes.  Remains of or waste from insects such as cockroaches.  What increases the risk?  You are more likely to develop this condition if you have a family history of allergies or other conditions related to allergies, including:  Allergic conjunctivitis. This is irritation and swelling of parts of the eyes and eyelids.  Asthma. This condition affects the lungs and makes it hard to breathe.  Atopic dermatitis or eczema. This is long term (chronic) irritation and swelling of the skin.  Food allergies.  What are the signs or symptoms?  Symptoms of this condition include:  Sneezing or coughing.  A stuffy nose (nasal congestion), itchy nose, or nasal discharge.  Itchy eyes and tearing of the eyes.  A feeling of mucus dripping down the back of your throat (postnasal drip). This may cause a sore throat.  Trouble sleeping.  Tiredness.  Headache.  How is this diagnosed?  This condition may be diagnosed with your symptoms, your medical history, and a physical exam. Your health care provider may check for related conditions, such as:  Asthma.  Pink eye. This is eye swelling and irritation caused by infection (conjunctivitis).  Ear infection.  Upper respiratory infection. This is an infection in the nose, throat, or upper airways.  You may also have tests to find out which allergens cause your symptoms. These may include skin tests or blood tests.  How is this treated?  There is no cure for this condition, but treatment can help control symptoms. Treatment may include:  Taking medicines that block allergy symptoms, such as corticosteroids (anti-inflammatories) and antihistamines. Medicine may be given as a shot, nasal spray, or pill.  Avoiding any allergens.  Being exposed again and again to tiny amounts of allergens to help you build a defense against allergens (allergenimmunotherapy). This is done if other treatments have not helped. It may include:  Allergy shots. These are  injected medicines that have small amounts of an allergen in them.  Sublingual immunotherapy. This involves taking small doses of a medicine with an allergen in it under your tongue.  If these treatments do not work, your provider may prescribe newer, stronger medicines.  Follow these instructions at home:  Avoiding allergens  Find out what you are allergic to and avoid those allergens. These are some things you can do to help avoid allergens:  If you have perennial allergies:  Replace carpet with wood, tile, or vinyl alma. Carpet can trap dander and dust.  Do not smoke. Do not allow smoking in your home  Change your heating and air conditioning filters at least once a month.  If you have seasonal allergies, take these steps during allergy season:  Keep windows closed as much as possible.  Plan outdoor activities when pollen counts are lowest. Check pollen counts before you plan outdoor activities  When coming indoors, change clothing and shower before sitting on furniture or bedding.  If you have a pet in the house that produces allergens:  Keep the pet out of the bedroom.  Vacuum, sweep, and dust regularly.  General instructions  Take over-the-counter and prescription medicines only as told by your provider.  Drink enough fluid to keep your pee pale yellow.  Where to find more information  American Academy of Allergy, Asthma & Immunology: aaaai.org  Contact a health care provider if:  You have a fever.  You develop a cough that does not go away.  You make high-pitched whistling sounds when you breathe, most often when you breathe out (wheeze).  Your symptoms slow you down or stop you from doing your normal activities each day.  Get help right away if:  You have shortness of breath.  This symptom may be an emergency. Get help right away. Call 911.  Do not wait to see if the symptoms will go away.  Do not drive yourself to the hospital.  This information is not intended to replace advice given to you by your  health care provider. Make sure you discuss any questions you have with your health care provider.  Document Revised: 08/28/2023 Document Reviewed: 08/28/2023  [employer]vier Patient Education © 2024 [employer].

## 2025-02-10 ENCOUNTER — TELEMEDICINE (OUTPATIENT)
Dept: FAMILY MEDICINE CLINIC | Facility: TELEHEALTH | Age: 40
End: 2025-02-10
Payer: MEDICAID

## 2025-02-10 DIAGNOSIS — B37.31 VAGINAL YEAST INFECTION: Primary | ICD-10-CM

## 2025-02-10 RX ORDER — FLUCONAZOLE 150 MG/1
TABLET ORAL
Qty: 3 TABLET | Refills: 0 | Status: SHIPPED | OUTPATIENT
Start: 2025-02-10

## 2025-02-10 NOTE — PROGRESS NOTES
You have chosen to receive care through a telehealth visit.  Do you consent to use a video/audio connection for your medical care today? Yes     Patient or patient representative verbalized consent for the use of [employer] during the visit with  MONIQUE Metzger for chart documentation. 2/10/2025  09:22 EST    CHIEF COMPLAINT  No chief complaint on file.        HPI  History of Present Illness  The patient is a 40-year-old female presenting with complaints of a yeast infection.    She reports that her symptoms began around 01/25/2025, coinciding with the onset of her menstrual cycle. Initially, she experienced mild itching, which has progressively worsened. On 02/03/2025, she self-administered Monistat 1, which provided temporary relief for a day before the symptoms returned with increased severity. She describes her current symptoms as persistent itching and thick discharge, a change from the previous watery, cottage cheese-like discharge. She maintains good personal hygiene and dietary habits, including limiting sugar intake. She has an upcoming appointment with her OB-GYN on 02/21/2025. She also questions whether her prolonged symptoms could be indicative of bacterial vaginosis (BV) rather than a yeast infection, noting the absence of any fever or fishy odor. Over the past year, she has been managing recurrent post-menstrual yeast infections with boric acid capsules, which have generally been effective. However, the most recent episode did not respond to Monistat 1.    MEDICATIONS  Current: Monistat, boric acid capsule       Review of Systems  See HPI    Past Medical History:   Diagnosis Date    Lyme disease 2017       Family History   Problem Relation Age of Onset    Heart disease Father     Alcohol abuse Father     Ovarian cancer Mother     No Known Problems Son     No Known Problems Daughter     No Known Problems Daughter     Uterine cancer Neg Hx     Colon cancer Neg Hx     Breast cancer Neg Hx         Social History     Socioeconomic History    Marital status:    Tobacco Use    Smoking status: Never     Passive exposure: Never    Smokeless tobacco: Never   Vaping Use    Vaping status: Never Used   Substance and Sexual Activity    Alcohol use: Never    Drug use: Never    Sexual activity: Yes     Partners: Male       Linda Comer  reports that she has never smoked. She has never been exposed to tobacco smoke. She has never used smokeless tobacco.             There were no vitals taken for this visit.    PHYSICAL EXAM  Physical Exam   Constitutional: She is oriented to person, place, and time. She appears well-developed and well-nourished. She does not have a sickly appearance. She does not appear ill.   HENT:   Head: Normocephalic and atraumatic.   Pulmonary/Chest: Effort normal.  No respiratory distress.  Neurological: She is alert and oriented to person, place, and time.           Diagnoses and all orders for this visit:    1. Vaginal yeast infection (Primary)  -     fluconazole (DIFLUCAN) 150 MG tablet; Take 1 tablet now, repeat every 72 hours for 3 doses  Dispense: 3 tablet; Refill: 0    --take medications as prescribed  --increase fluids, rest as needed, tylenol or ibuprofen for pain  --f/u in 5-7 days if no improvement      Assessment & Plan  1. Yeast infection.  She reports persistent itching and thick discharge since January 25, which worsened despite using Monistat 1. The symptoms suggest a yeast infection rather than bacterial vaginosis, which typically presents with a fishy odor. A prescription for fluconazole, to be taken every 3 days for a total of 3 doses, will be sent to [facility] in [place] on [address]. A swab test may be considered to confirm the diagnosis and rule out other conditions.         FOLLOW-UP  As discussed during visit with PCP/[facility] if no improvement or Urgent Care/Emergency Department if worsening of symptoms    Patient verbalizes understanding of medication  dosage, comfort measures, instructions for treatment and follow-up.    Tammy WAGGONERDarian Murry, APRN  02/10/2025  09:22 EST    Mode of Visit: Video  Location of patient: -HOME-  Location of provider: +HOME+  You have chosen to receive care through a telehealth visit.  The patient has signed the video visit consent form.  The visit included audio and video interaction. No technical issues occurred during this visit.    The use of a video visit has been reviewed with the patient and verbal informed consent has been obtained. Myself and Linda Comer     participated in this visit. The patient is located in [address] [place] [address]  I am located in [place], [place]. [employer] and [employer] Video Client were utilized. I spent 6 minutes in the patient's chart for this visit.      Note Disclaimer: At [facility], we believe that sharing information builds trust and better   relationships. You are receiving this note because you recently visited [facility]. It is possible you   will see health information before a provider has talked with you about it. This kind of information can   be easy to misunderstand. To help you fully understand what it means for your health, we urge you to   discuss this note with your provider.

## 2025-04-05 ENCOUNTER — TELEMEDICINE (OUTPATIENT)
Dept: FAMILY MEDICINE CLINIC | Facility: TELEHEALTH | Age: 40
End: 2025-04-05
Payer: MEDICAID

## 2025-04-05 DIAGNOSIS — H57.11 EYE PAIN, RIGHT: Primary | ICD-10-CM

## 2025-04-05 DIAGNOSIS — H57.89 EYE DRAINAGE: ICD-10-CM

## 2025-04-05 PROBLEM — D64.9 ABSOLUTE ANEMIA: Status: ACTIVE | Noted: 2024-05-02

## 2025-04-05 PROCEDURE — 1159F MED LIST DOCD IN RCRD: CPT | Performed by: NURSE PRACTITIONER

## 2025-04-05 PROCEDURE — 99213 OFFICE O/P EST LOW 20 MIN: CPT | Performed by: NURSE PRACTITIONER

## 2025-04-05 PROCEDURE — 1160F RVW MEDS BY RX/DR IN RCRD: CPT | Performed by: NURSE PRACTITIONER

## 2025-04-06 NOTE — PROGRESS NOTES
You have chosen to receive care through a telehealth visit.  Do you consent to use a video/audio connection for your medical care today? Yes     CHIEF COMPLAINT  Chief Complaint   Patient presents with    Eye Pain         HPI  Linda Comer is a 40 y.o. female  presents with complaint of   I would love to have some antibiotic drops for my eye. I don’t usually wear eye make up. It I did use some old eye makeup 2 days ago and I can’t verify that it what is causing this but I don’t thinks it’s allergies. I don’t have any other allergy symptoms or eye itchiness only stabbing pain in my right eye and a lot of discharge that came on suddenly today. I rinsed with water many times. Tried lubricating eye drops then tried Claritin just incase but the pain and discharge are still there.   Reports her symptoms started today suddenly. Reports her right eye. Red and a lot of yellow discharge. Reports her son had similar symptoms but was allergies. Reports she has flushed her eye. Reports the eye has been having crusty stuff on eyelashes. Reports the pain in her eye has been a 6. Reports the pain is sharp. Reports she has had pain all day in the eye. Reports she has used some lubricating eye drops. Denies getting anything in her eye. Denies any visual disturbance.      Review of Systems   Constitutional:  Negative for chills, fatigue and fever.   HENT:  Negative for congestion, ear discharge, ear pain, sinus pressure, sinus pain and sore throat.    Eyes:  Positive for pain, discharge and redness. Negative for photophobia and itching.   Respiratory:  Negative for cough, chest tightness, shortness of breath and wheezing.    Cardiovascular:  Negative for chest pain.   Gastrointestinal:  Negative for abdominal pain, diarrhea, nausea and vomiting.   Musculoskeletal:  Negative for back pain and myalgias.   Neurological:  Negative for dizziness and headaches.   Psychiatric/Behavioral: Negative.         Past Medical History:   Diagnosis Date     Lyme disease 2017       Family History   Problem Relation Age of Onset    Heart disease Father     Alcohol abuse Father     Ovarian cancer Mother     No Known Problems Son     No Known Problems Daughter     No Known Problems Daughter     Uterine cancer Neg Hx     Colon cancer Neg Hx     Breast cancer Neg Hx        Social History     Socioeconomic History    Marital status:    Tobacco Use    Smoking status: Never     Passive exposure: Never    Smokeless tobacco: Never   Vaping Use    Vaping status: Never Used   Substance and Sexual Activity    Alcohol use: Never    Drug use: Never    Sexual activity: Yes     Partners: Male       Lnida Comer  reports that she has never smoked. She has never been exposed to tobacco smoke. She has never used smokeless tobacco. I have educated her on the risk of diseases from using tobacco products such as cancer, COPD, and heart disease.         I spent  1  minutes counseling the patient.              LMP 02/05/2025   Breastfeeding No     PHYSICAL EXAM  Physical Exam   Constitutional: She is oriented to person, place, and time. She appears well-developed and well-nourished. No distress.   HENT:   Head: Normocephalic and atraumatic.   Right Ear: Hearing normal.   Left Ear: Hearing normal.   Nose: No congestion.   Mouth/Throat: Mouth/Lips are normal.  Eyes: Conjunctivae and lids are normal. Right eye exhibits discharge. Right eye exhibits no hordeolum and no edema. No foreign body present in the right eye. Left eye exhibits no discharge, no hordeolum and no edema. No foreign body present in the left eye.   Pulmonary/Chest: Effort normal.  No respiratory distress.  Abdominal: There is no abdominal tenderness.   Lymphadenopathy:        Right cervical: No anterior cervical adenopathy present.       Left cervical: No anterior cervical adenopathy present.   Neurological: She is alert and oriented to person, place, and time.   Psychiatric: She has a normal mood and affect. Her speech  is normal and behavior is normal.           Diagnoses and all orders for this visit:    1. Eye pain, right (Primary)    2. Eye drainage    Advised patient to go to the ER for in person evaluation of her eye.  Patient reports she is having sudden onset of sharp stabbing pain in the right eye.  Advised patient she needs to go to the ER for evaluation of the eye with eye equipment.  Patient understands the risk of not going to a higher level of care.  Patient reports she will go to the ER for in person evaluation.  Reports she will have family member to take her there.         ShayyGermaine Chan, MONIQUE  04/05/2025  21:45 EDT    Mode of Visit: Video  Location of patient: -HOME-  Location of provider: +HOME+  You have chosen to receive care through a telehealth visit.  The patient has signed the video visit consent form.  The visit included audio and video interaction. No technical issues occurred during this visit.      The use of a video visit has been reviewed with the patient and verbal informed consent has been obtained. Myself and Linda Comer participated in this visit. The patient is located in [address] [place] [address].   I am located in [place], [place]. [employer] and [employer] were utilized. I spent 5 minutes in the patient's chart for this visit.         Note Disclaimer: At [facility], we believe that sharing information builds trust and better   relationships. You are receiving this note because you recently visited [facility]. It is possible you   will see health information before a provider has talked with you about it. This kind of information can   be easy to misunderstand. To help you fully understand what it means for your health, we urge you to   discuss this note with your provider.

## 2025-07-07 RX ORDER — FLUCONAZOLE 150 MG/1
150 TABLET ORAL ONCE
Qty: 2 TABLET | Refills: 0 | Status: SHIPPED | OUTPATIENT
Start: 2025-07-07 | End: 2025-07-07